# Patient Record
Sex: FEMALE | Race: WHITE | NOT HISPANIC OR LATINO | Employment: OTHER | URBAN - METROPOLITAN AREA
[De-identification: names, ages, dates, MRNs, and addresses within clinical notes are randomized per-mention and may not be internally consistent; named-entity substitution may affect disease eponyms.]

---

## 2018-01-11 NOTE — MISCELLANEOUS
Message   Recorded as Task   Date: 10/24/2016 08:12 PM, Created By: María Gibson   Task Name: Follow Up   Assigned To: [facility]   Regarding Patient: Aviva Rowland, Status: Complete   CommentThora Smart - 24 Oct 2016 8:12 PM     TASK CREATED  please notify pt that I refilled her anxiety med but no further refills will be given until she is seen - I have not seen pt since Aug 2015 and pt has to be seen AT LEAST once a year to con't getting any meds from me   Izabella Preston - 25 Oct 2016 8:03 AM     TASK REPLIED TO: Previously Assigned To [facility]    Pt aware--scheduled with Kaelyn Dunham for thursday  Kirsten Prestonela - 25 Oct 2016 8:03 AM     TASK COMPLETED        Active Problems    1  Abnormal mammogram of right breast (793 80) (R92 8)   2  Abnormal renal ultrasound (793 5) (R93 429)   3  Anxiety (300 00) (F41 9)   4  Back pain (724 5) (M54 9)   5  Cervical disc herniation (722 0) (M50 20)   6  Cervical radiculopathy at C8 (723 4) (M54 12)   7  History of Clear cell renal cell carcinoma, right (189 0) (C64 1)   8  Encounter for screening mammogram for malignant neoplasm of breast (V76 12)   (Z12 31)   9  Epileptic seizures (345 90) (G40 909)   10  Hip pain, bilateral (719 45) (M25 551,M25 552)   11  Hyperglycemia (790 29) (R73 9)   12  Lump of skin (782 2) (R22 9)   13  Migraine headache (346 90) (G43 909)   14  Need for Tdap vaccination (V06 1) (Z23)   15  Nicotine dependence (305 1) (F17 200)   16  Numbness of foot (782 0) (R20 0)   17  Screening for diabetes mellitus (V77 1) (Z13 1)   18  Screening for thyroid disorder (V77 0) (Z13 29)   19  Skin cancer screening (V76 43) (Z12 83)   20  Viral infection (079 99) (B34 9)   21  Visit for gynecologic examination (V72 31) (Z01 419)    Current Meds   1  ALPRAZolam 1 MG Oral Tablet; take 1 tablet by mouth every 8 hours if needed;    Therapy: [id number] to (Lisa Price)  Requested for: [id number]; Last   Champ Moat Ordered   2  Calcium 600+D 600-200 MG-UNIT Oral Tablet; TAKE 1 TABLET DAILY; Therapy: [id number] to Recorded   3  Magnesium Oxide 400 MG Oral Tablet; 1 tab po q day; Therapy: [id number] to Recorded    Allergies    1   No Known Drug Allergies    Signatures   Electronically signed by : Janne Sacks; Oct 26 2016  9:05AM EST                       (Author)

## 2018-11-02 ENCOUNTER — OFFICE VISIT (OUTPATIENT)
Dept: URGENT CARE | Facility: CLINIC | Age: 48
End: 2018-11-02
Payer: COMMERCIAL

## 2018-11-02 VITALS
TEMPERATURE: 98 F | OXYGEN SATURATION: 98 % | BODY MASS INDEX: 25.61 KG/M2 | WEIGHT: 150 LBS | HEIGHT: 64 IN | HEART RATE: 76 BPM | SYSTOLIC BLOOD PRESSURE: 134 MMHG | DIASTOLIC BLOOD PRESSURE: 76 MMHG | RESPIRATION RATE: 16 BRPM
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
## 2018-11-02 DIAGNOSIS — S00.06XA TICK BITE OF SCALP, INITIAL ENCOUNTER: Primary | ICD-10-CM

## 2018-11-02 PROCEDURE — G0382 LEV 3 HOSP TYPE B ED VISIT: HCPCS | Performed by: FAMILY MEDICINE

## 2018-11-02 PROCEDURE — 99283 EMERGENCY DEPT VISIT LOW MDM: CPT | Performed by: FAMILY MEDICINE

## 2018-11-02 RX ORDER — DOXYCYCLINE HYCLATE 100 MG/1
CAPSULE ORAL
Qty: 2 CAPSULE | Refills: 0 | Status: SHIPPED | COMMUNITY
Start: 2018-11-02 | End: 2018-11-02

## 2018-11-02 NOTE — PATIENT INSTRUCTIONS
Remainder tick removed with splinter forceps successfully in care now  Doxycycline 100 mg 2 capsules now with food  Follow-up with PCP as needed

## 2018-11-02 NOTE — PROGRESS NOTES
[facility]        NAME: Guerita Lares is a 46 y o  female  : 1970    MRN: [id number]  DATE: 2018  TIME: 7:51 PM    Assessment and Plan   Tick bite of scalp, initial encounter [S00 06XA, W57  XXXA]  1  Tick bite of scalp, initial encounter  doxycycline hyclate (VIBRAMYCIN) 100 mg capsule         Patient Instructions   Patient Instructions   Remainder tick removed with splinter forceps successfully in care now  Doxycycline 100 mg 2 capsules now with food  Follow-up with PCP as needed        Proceed to  ER if symptoms worsen  Chief Complaint     Chief Complaint   Patient presents with   [address]     in scalp found today         History of Present Illness       Patient presents with a tick in her scalp that was noticed approximately 35 minutes ago, her  attempted to remove it but was unsuccessful or having only having removed part of it  No headache no fatigue no fever no chills        Review of Systems   Review of Systems   Skin: Positive for wound  Current Medications       Current Outpatient Prescriptions:     doxycycline hyclate (VIBRAMYCIN) 100 mg capsule, Take 2 capsules by mouth now with food, Disp: 2 capsule, Rfl: 0    Current Allergies     Allergies as of 2018    (No Known Allergies)            The following portions of the patient's history were reviewed and updated as appropriate: allergies, current medications, past family history, past medical history, past social history, past surgical history and problem list      No past medical history on file  No past surgical history on file  No family history on file  Medications have been verified          Objective   /76   Pulse 76   Temp 98 °F (36 7 °C)   Resp 16   Ht 5' 4" (1 626 m)   Wt 68 kg (150 lb)   SpO2 98%   BMI 25 75 kg/m²        Physical Exam     Physical Exam   Skin:   Small tick consistent with ear tick embedded in crown of scalp, mild surrounding hyperemia

## 2019-04-19 ENCOUNTER — TELEPHONE (OUTPATIENT)
Dept: FAMILY MEDICINE CLINIC | Facility: HOSPITAL | Age: 49
End: 2019-04-19

## 2019-04-19 DIAGNOSIS — Z12.39 SCREENING FOR BREAST CANCER: Primary | ICD-10-CM

## 2019-09-09 ENCOUNTER — TELEPHONE (OUTPATIENT)
Dept: FAMILY MEDICINE CLINIC | Facility: HOSPITAL | Age: 49
End: 2019-09-09

## 2019-09-09 NOTE — TELEPHONE ENCOUNTER
SPOKE TO PATIENT - SHE CAN NOT SCHEDULE AT THIS TIME AS SHE DOES NOT HAVE INSURANCE OR THE FINANCES TO SUPPORT A VISIT

## 2020-01-28 NOTE — TELEPHONE ENCOUNTER
01/28/20 4:48 PM     Thank you for your request  Your request has been received, reviewed, and the patient chart updated  The PCP has successfully been removed with a patient attribution note  This message will now be completed      Thank you  Jayro Bloom

## 2022-11-10 ENCOUNTER — APPOINTMENT (OUTPATIENT)
Dept: RADIOLOGY | Facility: HOSPITAL | Age: 52
End: 2022-11-10

## 2022-11-10 ENCOUNTER — HOSPITAL ENCOUNTER (EMERGENCY)
Facility: HOSPITAL | Age: 52
Discharge: HOME | End: 2022-11-10
Attending: EMERGENCY MEDICINE

## 2022-11-10 VITALS
WEIGHT: 155 LBS | HEIGHT: 64 IN | DIASTOLIC BLOOD PRESSURE: 73 MMHG | OXYGEN SATURATION: 98 % | HEART RATE: 91 BPM | RESPIRATION RATE: 16 BRPM | TEMPERATURE: 98.2 F | BODY MASS INDEX: 26.46 KG/M2 | SYSTOLIC BLOOD PRESSURE: 144 MMHG

## 2022-11-10 DIAGNOSIS — J18.9 COMMUNITY ACQUIRED PNEUMONIA: ICD-10-CM

## 2022-11-10 DIAGNOSIS — R07.81 RIB PAIN ON RIGHT SIDE: Primary | ICD-10-CM

## 2022-11-10 RX ORDER — ALBUTEROL SULFATE 90 UG/1
2 AEROSOL, METERED RESPIRATORY (INHALATION) EVERY 6 HOURS PRN
Qty: 18 G | Refills: 0 | Status: SHIPPED | OUTPATIENT
Start: 2022-11-10

## 2022-11-10 RX ORDER — AZITHROMYCIN 250 MG/1
TABLET, FILM COATED ORAL
Qty: 6 TABLET | Refills: 0 | Status: SHIPPED | OUTPATIENT
Start: 2022-11-10 | End: 2022-11-14

## 2022-11-10 RX ORDER — METHOCARBAMOL 500 MG/1
500 TABLET, FILM COATED ORAL 3 TIMES DAILY
Qty: 20 TABLET | Refills: 0 | Status: SHIPPED | OUTPATIENT
Start: 2022-11-10

## 2022-11-10 NOTE — ED PROVIDER NOTES
History  Chief Complaint   Patient presents with   • Rib Pain     Pt with right lower rib pain since October, reports having this pain since a coughing fit in October, no injury  Patient is a 44 y/o F that presents to the ED with cough for 2 months  Patient states she had URI symptoms, but her cough is persisting  She states sometimes the cough is dry, sometimes she coughs up mucus  She took robitussin for the cough, but it didn't help  She states 1 month ago she coughed and felt a pop in her right ribs and has had pain since then  She states the pain is worse with coughing, deep breaths and movement of right arm  No fevers, chills  Her  was sick with similar symptoms  She has been taking advil for the pain  History provided by:  Patient  Cough  Cough characteristics:  Non-productive  Severity:  Moderate  Onset quality:  Gradual  Duration: 2 months  Timing:  Constant  Progression:  Unchanged  Chronicity:  New  Smoker: yes    Context: sick contacts and upper respiratory infection    Relieved by:  Nothing  Worsened by:  Deep breathing  Ineffective treatments:  Cough suppressants  Associated symptoms: rhinorrhea, sinus congestion, sore throat and wheezing    Associated symptoms: no chills, no fever, no headaches, no myalgias, no rash and no shortness of breath    Associated symptoms comment:  Right rib pain      None       History reviewed  No pertinent past medical history  History reviewed  No pertinent surgical history  History reviewed  No pertinent family history  I have reviewed and agree with the history as documented      E-Cigarette/Vaping   • E-Cigarette Use Never User      E-Cigarette/Vaping Substances   • Nicotine No    • THC No    • CBD No    • Flavoring No    • Other No    • Unknown No      Social History     Tobacco Use   • Smoking status: Current Some Day Smoker     Packs/day: 1 00     Types: Cigarettes   • Smokeless tobacco: Never Used   Vaping Use   • Vaping Use: Never used   Substance Use Topics   • Alcohol use: Yes     Comment: rare   • Drug use: Never       Review of Systems   Constitutional: Negative for chills and fever  HENT: Positive for congestion, rhinorrhea and sore throat  Respiratory: Positive for cough and wheezing  Negative for shortness of breath  Cardiovascular: Negative for palpitations and leg swelling  Right rib pain   Gastrointestinal: Negative for abdominal pain, diarrhea, nausea and vomiting  Musculoskeletal: Negative for back pain and myalgias  Skin: Negative for color change and rash  Neurological: Negative for dizziness, weakness, light-headedness, numbness and headaches  Psychiatric/Behavioral: Negative for confusion  All other systems reviewed and are negative  Physical Exam  Physical Exam  Vitals and nursing note reviewed  Constitutional:       General: She is not in acute distress  Appearance: Normal appearance  She is well-developed, well-groomed and normal weight  She is not ill-appearing or diaphoretic  HENT:      Head: Normocephalic and atraumatic  Right Ear: Hearing and external ear normal       Left Ear: Hearing and external ear normal       Nose: Nose normal       Mouth/Throat:      Mouth: Mucous membranes are moist    Eyes:      Conjunctiva/sclera: Conjunctivae normal    Cardiovascular:      Rate and Rhythm: Normal rate and regular rhythm  Heart sounds: Normal heart sounds  Pulmonary:      Effort: Pulmonary effort is normal  No tachypnea or accessory muscle usage  Breath sounds: Examination of the left-upper field reveals wheezing  Examination of the left-middle field reveals wheezing  Examination of the left-lower field reveals wheezing  Wheezing present  No decreased breath sounds, rhonchi or rales  Chest:      Chest wall: Tenderness present  No deformity or swelling  Comments: Tenderness to right lateral ribs  Abdominal:      General: Abdomen is flat   Bowel sounds are normal       Palpations: Abdomen is soft  Tenderness: There is no abdominal tenderness  Musculoskeletal:         General: Normal range of motion  Cervical back: Normal range of motion and neck supple  Right lower leg: No edema  Left lower leg: No edema  Skin:     General: Skin is warm and dry  Coloration: Skin is not jaundiced or pale  Findings: No rash  Neurological:      General: No focal deficit present  Mental Status: She is alert and oriented to person, place, and time  Motor: No weakness  Psychiatric:         Mood and Affect: Mood normal          Behavior: Behavior is cooperative  Vital Signs  ED Triage Vitals [11/10/22 1453]   Temperature Pulse Respirations Blood Pressure SpO2   98 2 °F (36 8 °C) 91 16 144/73 98 %      Temp Source Heart Rate Source Patient Position - Orthostatic VS BP Location FiO2 (%)   Temporal Monitor Sitting Right arm --      Pain Score       7           Vitals:    11/10/22 1453   BP: 144/73   Pulse: 91   Patient Position - Orthostatic VS: Sitting         Visual Acuity      ED Medications  Medications - No data to display    Diagnostic Studies  Results Reviewed     None                 XR ribs with pa chest min 3 views LEFT   ED Interpretation by Brady Brittle, PA-C (11/10 2211)   No acute rib fracture, no pneumothorax  Procedures  Procedures         ED Course                               SBIRT 20yo+    Flowsheet Row Most Recent Value   SBIRT (23 yo +)    In order to provide better care to our patients, we are screening all of our patients for alcohol and drug use  Would it be okay to ask you these screening questions? No Filed at: 11/10/2022 1457                    MDM  Number of Diagnoses or Management Options  Community acquired pneumonia: new and requires workup  Rib pain on right side: new and requires workup  Diagnosis management comments: Patient with rib pain, cough, will order CXR to r/o pneumonia  Patient with wheezing only on left, will treat for early pneumonia and start on zpak and albuterol  Patient with rib pain, no acute fracture on xray  Pulse ox stable at 98%, stable for discharge  WIll given robaxin for rib pain  Return precautions given  Amount and/or Complexity of Data Reviewed  Tests in the radiology section of CPT®: ordered and reviewed  Independent visualization of images, tracings, or specimens: yes    Patient Progress  Patient progress: stable      Disposition  Final diagnoses:   Rib pain on right side   Community acquired pneumonia     Time reflects when diagnosis was documented in both MDM as applicable and the Disposition within this note     Time User Action Codes Description Comment    11/10/2022  3:55 PM Yordan Mandel Add [R07 81] Rib pain on right side     11/10/2022  3:55 PM Yordan Mandel Add [J18 9] Community acquired pneumonia       ED Disposition     ED Disposition   Discharge    Condition   Stable    Date/Time   Thu Nov 10, 2022  3:55 PM    Comment   Ayo Lou discharge to home/self care  Follow-up Information     Follow up With Specialties Details Why Contact Info    your family doctor  Schedule an appointment as soon as possible for a visit in 1 week For recheck           Discharge Medication List as of 11/10/2022  3:57 PM      START taking these medications    Details   albuterol (Ventolin HFA) 90 mcg/act inhaler Inhale 2 puffs every 6 (six) hours as needed for wheezing, Starting Thu 11/10/2022, Normal      azithromycin (Zithromax Z-Sang) 250 mg tablet Take 2 tablets today then 1 tablet daily x 4 days, Normal      methocarbamol (ROBAXIN) 500 mg tablet Take 1 tablet (500 mg total) by mouth 3 (three) times a day, Starting Thu 11/10/2022, Normal             No discharge procedures on file      PDMP Review     None          ED Provider  Electronically Signed by           Belgica Grimaldo PA-C  11/10/22 6352

## 2022-11-10 NOTE — DISCHARGE INSTRUCTIONS
Rest, ice to right ribs  Take robaxin 3 times a day for pain  Take zpak as directed  Use albuterol inhaler every 4 hours for wheezing  Follow up with family doctor in 1 week for recheck  Return to ER if symptoms worsen

## 2024-05-01 ENCOUNTER — OFFICE VISIT (OUTPATIENT)
Dept: URGENT CARE | Facility: CLINIC | Age: 54
End: 2024-05-01
Payer: COMMERCIAL

## 2024-05-01 VITALS
TEMPERATURE: 99 F | DIASTOLIC BLOOD PRESSURE: 72 MMHG | HEART RATE: 86 BPM | OXYGEN SATURATION: 97 % | SYSTOLIC BLOOD PRESSURE: 137 MMHG | RESPIRATION RATE: 18 BRPM

## 2024-05-01 DIAGNOSIS — J01.10 ACUTE NON-RECURRENT FRONTAL SINUSITIS: Primary | ICD-10-CM

## 2024-05-01 PROCEDURE — 99213 OFFICE O/P EST LOW 20 MIN: CPT | Performed by: FAMILY MEDICINE

## 2024-05-01 RX ORDER — AZITHROMYCIN 250 MG/1
TABLET, FILM COATED ORAL
Qty: 6 TABLET | Refills: 0 | Status: SHIPPED | OUTPATIENT
Start: 2024-05-01 | End: 2024-05-05

## 2024-05-01 RX ORDER — METHYLPREDNISOLONE 4 MG/1
TABLET ORAL
Qty: 21 TABLET | Refills: 0 | Status: SHIPPED | OUTPATIENT
Start: 2024-05-01

## 2024-05-01 NOTE — PROGRESS NOTES
[facility]        NAME: Shahnaz Benites is a 53 y.o. female  : 1970    MRN: [id number]  DATE: May 1, 2024  TIME: 12:26 PM    Assessment and Plan   Acute non-recurrent frontal sinusitis [J01.10]  1. Acute non-recurrent frontal sinusitis  azithromycin (ZITHROMAX) 250 mg tablet    methylPREDNISolone 4 MG tablet therapy pack            Patient Instructions       Follow up with PCP in 3-5 days.  Proceed to  ER if symptoms worsen.    If tests have been performed at [facility], our office will contact you with results if changes need to be made to the care plan discussed with you at the visit.  You can review your full results on [facility].    Chief Complaint     Chief Complaint   Patient presents with    Earache     Patient has had right ear fullness and drainage for the past few months. Also notes increased fatigue         History of Present Illness       Earache         Review of Systems   Review of Systems   Constitutional: Negative.    HENT:  Positive for congestion and ear pain.    Eyes: Negative.    Respiratory: Negative.     Cardiovascular: Negative.    Gastrointestinal: Negative.    Genitourinary: Negative.    Skin: Negative.    Allergic/Immunologic: Negative.    Neurological: Negative.    Hematological: Negative.    Psychiatric/Behavioral: Negative.           Current Medications       Current Outpatient Medications:     azithromycin (ZITHROMAX) 250 mg tablet, Take 2 tablets today then 1 tablet daily x 4 days, Disp: 6 tablet, Rfl: 0    methylPREDNISolone 4 MG tablet therapy pack, Use as directed on package, Disp: 21 tablet, Rfl: 0    albuterol (Ventolin HFA) 90 mcg/act inhaler, Inhale 2 puffs every 6 (six) hours as needed for wheezing, Disp: 18 g, Rfl: 0    methocarbamol (ROBAXIN) 500 mg tablet, Take 1 tablet (500 mg total) by mouth 3 (three) times a day, Disp: 20 tablet, Rfl: 0    Current Allergies     Allergies as of 2024    (No Known Allergies)            The following portions  of the patient's history were reviewed and updated as appropriate: allergies, current medications, past family history, past medical history, past social history, past surgical history and problem list.     No past medical history on file.    No past surgical history on file.    No family history on file.      Medications have been verified.        Objective   /72   Pulse 86   Temp 99 °F (37.2 °C)   Resp 18   SpO2 97%   No LMP recorded. Patient is postmenopausal.       Physical Exam     Physical Exam  Vitals and nursing note reviewed.   Constitutional:       Appearance: She is well-developed.   HENT:      Head: Normocephalic.      Nose: Congestion present.   Eyes:      Pupils: Pupils are equal, round, and reactive to light.   Cardiovascular:      Rate and Rhythm: Normal rate.   Pulmonary:      Effort: Pulmonary effort is normal.   Abdominal:      General: Abdomen is flat.   Musculoskeletal:         General: Normal range of motion.      Cervical back: Normal range of motion.   Skin:     General: Skin is warm and dry.   Neurological:      Mental Status: She is alert and oriented to person, place, and time.

## 2024-05-31 PROBLEM — J01.10 ACUTE NON-RECURRENT FRONTAL SINUSITIS: Status: RESOLVED | Noted: 2024-05-01 | Resolved: 2024-05-31

## 2024-08-14 ENCOUNTER — TELEPHONE (OUTPATIENT)
Age: 54
End: 2024-08-14

## 2024-08-14 NOTE — TELEPHONE ENCOUNTER
Pt no longer a patient here, hasn't been seen since 2016.   Fax sent that no longer a patient here.

## 2024-08-14 NOTE — TELEPHONE ENCOUNTER
Cardiologist office is calling to request last 2 clinical notes on this patient, she has an appointment scheduled for tomorrow morning  Please fax notes to [phone]    I advised that according to our notes patient has not been seen here since 2019, cardiologist office will be reaching out to the patient to see if she has another primary provider

## 2024-09-05 ENCOUNTER — OFFICE VISIT (OUTPATIENT)
Dept: URGENT CARE | Facility: CLINIC | Age: 54
End: 2024-09-05
Payer: COMMERCIAL

## 2024-09-05 VITALS
RESPIRATION RATE: 18 BRPM | HEART RATE: 76 BPM | OXYGEN SATURATION: 99 % | DIASTOLIC BLOOD PRESSURE: 66 MMHG | SYSTOLIC BLOOD PRESSURE: 128 MMHG | TEMPERATURE: 99.1 F

## 2024-09-05 DIAGNOSIS — R30.0 DYSURIA: Primary | ICD-10-CM

## 2024-09-05 LAB
SL AMB  POCT GLUCOSE, UA: NORMAL
SL AMB LEUKOCYTE ESTERASE,UA: NORMAL
SL AMB POCT BILIRUBIN,UA: NORMAL
SL AMB POCT BLOOD,UA: NORMAL
SL AMB POCT CLARITY,UA: CLEAR
SL AMB POCT COLOR,UA: YELLOW
SL AMB POCT KETONES,UA: NORMAL
SL AMB POCT NITRITE,UA: NORMAL
SL AMB POCT PH,UA: 6
SL AMB POCT SPECIFIC GRAVITY,UA: 1.01
SL AMB POCT URINE PROTEIN: NORMAL
SL AMB POCT UROBILINOGEN: 0.2

## 2024-09-05 PROCEDURE — G0382 LEV 3 HOSP TYPE B ED VISIT: HCPCS

## 2024-09-05 PROCEDURE — 81002 URINALYSIS NONAUTO W/O SCOPE: CPT

## 2024-09-05 PROCEDURE — 87086 URINE CULTURE/COLONY COUNT: CPT

## 2024-09-05 NOTE — PROGRESS NOTES
Ambulatory Visit  Name: Shahnaz Benites      : 1970      MRN: [id number]  Encounter Provider: [facility]  Encounter Date: 2024   Encounter department: [facility] [place]    Assessment & Plan   1. Dysuria  -     POCT urine dip  -     Urine culture; Future  -     Urine culture      History of Present Illness     Shahnaz Benites is a 54 y.o. female who presents abdominal pressure and headache since Monday. Monday woke up with a headache and took 2 airborne. Yesterday started having chills and abdominal pressure with urgency. Within the last month diagnosed with Diabetes, given metformin to take, and told to drink lots of water. Also last started on toprol, but unsure why.     Review of Systems   Constitutional:  Positive for chills. Negative for fatigue and fever.   HENT:  Negative for sinus pressure.    Respiratory:  Negative for cough and shortness of breath.    Cardiovascular:  Negative for chest pain and palpitations.   Genitourinary:  Positive for dysuria and urgency. Negative for decreased urine volume and hematuria.   Skin:  Negative for color change.   Neurological:  Positive for headaches. Negative for dizziness and light-headedness.       Objective     /66   Pulse 76   Temp 99.1 °F (37.3 °C)   Resp 18   SpO2 99%     Physical Exam  Vitals and nursing note reviewed.   Constitutional:       General: She is not in acute distress.     Appearance: Normal appearance. She is not ill-appearing.   HENT:      Head: Normocephalic and atraumatic.   Cardiovascular:      Rate and Rhythm: Normal rate and regular rhythm.      Heart sounds: Normal heart sounds.   Pulmonary:      Effort: Pulmonary effort is normal.      Breath sounds: Normal breath sounds.   Abdominal:      Tenderness: There is abdominal tenderness (suprapubic). There is no right CVA tenderness or left CVA tenderness.   Skin:     General: Skin is warm and dry.   Neurological:      Mental Status: She is alert and  oriented to person, place, and time.             Answers submitted by the patient for this visit:  Painful Urination Questionnaire (Submitted on 9/5/2024)  Chief Complaint: Dysuria  Chronicity: new  Onset: yesterday  Frequency: every urination  Progression since onset: unchanged  Pain quality: aching  Pain severity: mild  Pain - numeric: 3/10  Fever: no fever  Sexually active?: Yes  History of pyelonephritis?: No

## 2024-09-05 NOTE — PATIENT INSTRUCTIONS
No sign of UTI on urine dip in clinic. Will be sent for culture. Will follow up on urine culture results and prescribe antibiotic as needed.    Urine culture sent, we will notify you if any changes need to be made to your medication.    Stay hydrated with water to help flush out bacteria. Avoid bladder irritants such as citrus, caffeine, chocolate, and coffee.     Follow up with your PCP in 3-5 days.    Go to the ER if symptoms worsen.

## 2024-09-06 LAB — BACTERIA UR CULT: NORMAL

## 2024-11-24 ENCOUNTER — OFFICE VISIT (OUTPATIENT)
Dept: URGENT CARE | Facility: CLINIC | Age: 54
End: 2024-11-24
Payer: COMMERCIAL

## 2024-11-24 VITALS
HEART RATE: 70 BPM | TEMPERATURE: 97.3 F | SYSTOLIC BLOOD PRESSURE: 110 MMHG | RESPIRATION RATE: 18 BRPM | OXYGEN SATURATION: 97 % | DIASTOLIC BLOOD PRESSURE: 72 MMHG

## 2024-11-24 DIAGNOSIS — J01.00 ACUTE MAXILLARY SINUSITIS, RECURRENCE NOT SPECIFIED: Primary | ICD-10-CM

## 2024-11-24 PROCEDURE — 99213 OFFICE O/P EST LOW 20 MIN: CPT

## 2024-11-24 RX ORDER — METOPROLOL SUCCINATE 50 MG/1
1 TABLET, EXTENDED RELEASE ORAL 2 TIMES DAILY
COMMUNITY
Start: 2024-11-14

## 2024-11-24 NOTE — PROGRESS NOTES
[facility]        NAME: Shahnaz Benites is a 54 y.o. female  : 1970    MRN: [id number]  DATE: 2024  TIME: 11:09 AM    Assessment and Plan   Acute maxillary sinusitis, recurrence not specified [J01.00]  1. Acute maxillary sinusitis, recurrence not specified  amoxicillin-clavulanate (AUGMENTIN) 875-125 mg per tablet            Patient Instructions       Follow up with PCP in 3-5 days.  Proceed to  ER if symptoms worsen.    If tests have been performed at [facility], our office will contact you with results if changes need to be made to the care plan discussed with you at the visit.  You [facility] on [facility].    Chief Complaint     Chief Complaint   Patient presents with    Possible Sinus Infection     Pt reports fatigue, sinus pressure in cheeks, forehead, and gums.          History of Present Illness       54-year-old female presents for worsening cold-like symptoms x 7 days.  Patient had URI-like symptoms which have progressively been worsening.  Denies fevers and chills.  No use of over-the-counter medicine.  Son was ill around symptom onset.  Pain is now progressed into dental pain and facial pain        Review of Systems   Review of Systems   Constitutional:  Negative for chills and fever.   HENT:  Positive for congestion, ear pain, postnasal drip, sinus pressure and sinus pain. Negative for sore throat.    Respiratory:  Positive for cough.          Current Medications       Current Outpatient Medications:     amoxicillin-clavulanate (AUGMENTIN) 875-125 mg per tablet, Take 1 tablet by mouth every 12 (twelve) hours for 5 days, Disp: 10 tablet, Rfl: 0    metFORMIN (GLUCOPHAGE) 500 mg tablet, TAKE 1 TABLET BY MOUTH WITH MORNING MEAL AND TWO TABLETS WITH EVENING MEAL, Disp: , Rfl:     metoprolol succinate (TOPROL-XL) 50 mg 24 hr tablet, Take 1 tablet by mouth 2 (two) times a day, Disp: , Rfl:     albuterol (Ventolin HFA) 90 mcg/act inhaler, Inhale 2 puffs  every 6 (six) hours as needed for wheezing, Disp: 18 g, Rfl: 0    methocarbamol (ROBAXIN) 500 mg tablet, Take 1 tablet (500 mg total) by mouth 3 (three) times a day, Disp: 20 tablet, Rfl: 0    methylPREDNISolone 4 MG tablet therapy pack, Use as directed on package, Disp: 21 tablet, Rfl: 0    Current Allergies     Allergies as of 11/24/2024    (No Known Allergies)            The following portions of the patient's history were reviewed and updated as appropriate: allergies, current medications, past family history, past medical history, past social history, past surgical history and problem list.     No past medical history on file.    No past surgical history on file.    No family history on file.      Medications have been verified.        Objective   /72   Pulse 70   Temp (!) 97.3 °F (36.3 °C)   Resp 18   SpO2 97%   No LMP recorded. Patient is postmenopausal.       Physical Exam     Physical Exam  Vitals and nursing note reviewed.   Constitutional:       General: She is not in acute distress.     Appearance: She is not toxic-appearing.   HENT:      Head: Normocephalic and atraumatic.      Right Ear: Tympanic membrane, ear canal and external ear normal.      Left Ear: Tympanic membrane, ear canal and external ear normal.      Nose: Congestion present.      Mouth/Throat:      Mouth: Mucous membranes are moist.      Pharynx: No oropharyngeal exudate or posterior oropharyngeal erythema.   Eyes:      Conjunctiva/sclera: Conjunctivae normal.   Cardiovascular:      Rate and Rhythm: Normal rate and regular rhythm.   Pulmonary:      Effort: Pulmonary effort is normal.      Breath sounds: Normal breath sounds.   Lymphadenopathy:      Cervical: No cervical adenopathy.   Neurological:      Mental Status: She is alert.   Psychiatric:         Mood and Affect: Mood normal.         Behavior: Behavior normal.

## 2024-12-02 ENCOUNTER — HOSPITAL ENCOUNTER (OUTPATIENT)
Dept: HOSPITAL 99 - GI | Age: 54
End: 2024-12-02
Payer: COMMERCIAL

## 2024-12-02 DIAGNOSIS — K62.1: ICD-10-CM

## 2024-12-02 DIAGNOSIS — K63.5: ICD-10-CM

## 2024-12-02 DIAGNOSIS — D12.0: ICD-10-CM

## 2024-12-02 DIAGNOSIS — Z12.11: Primary | ICD-10-CM

## 2024-12-02 DIAGNOSIS — D12.3: ICD-10-CM

## 2024-12-02 LAB — GLUCOSE - POINT OF CARE: 139 MG/DL (ref 70–99)

## 2024-12-02 PROCEDURE — 45380 COLONOSCOPY AND BIOPSY: CPT

## 2024-12-02 PROCEDURE — 88305 TISSUE EXAM BY PATHOLOGIST: CPT

## 2024-12-02 PROCEDURE — 45385 COLONOSCOPY W/LESION REMOVAL: CPT

## 2025-04-07 ENCOUNTER — HOSPITAL ENCOUNTER (OUTPATIENT)
Dept: HOSPITAL 99 - EEG | Age: 55
End: 2025-04-07
Payer: COMMERCIAL

## 2025-04-07 DIAGNOSIS — Z87.898: ICD-10-CM

## 2025-04-07 DIAGNOSIS — H53.9: Primary | ICD-10-CM

## 2025-04-26 ENCOUNTER — HOSPITAL ENCOUNTER (OUTPATIENT)
Dept: HOSPITAL 99 - RAD | Age: 55
End: 2025-04-26
Payer: SELF-PAY

## 2025-04-26 DIAGNOSIS — E78.2: Primary | ICD-10-CM
